# Patient Record
Sex: FEMALE | Race: BLACK OR AFRICAN AMERICAN | Employment: UNEMPLOYED | URBAN - METROPOLITAN AREA
[De-identification: names, ages, dates, MRNs, and addresses within clinical notes are randomized per-mention and may not be internally consistent; named-entity substitution may affect disease eponyms.]

---

## 2019-04-25 ENCOUNTER — OFFICE VISIT (OUTPATIENT)
Dept: PRIMARY CARE CLINIC | Age: 29
End: 2019-04-25

## 2019-04-25 ENCOUNTER — HOSPITAL ENCOUNTER (OUTPATIENT)
Dept: GENERAL RADIOLOGY | Age: 29
Discharge: HOME | End: 2019-04-25
Payer: COMMERCIAL

## 2019-04-25 VITALS
BODY MASS INDEX: 21.18 KG/M2 | TEMPERATURE: 98 F | HEIGHT: 61 IN | RESPIRATION RATE: 16 BRPM | HEART RATE: 80 BPM | OXYGEN SATURATION: 100 % | SYSTOLIC BLOOD PRESSURE: 120 MMHG | DIASTOLIC BLOOD PRESSURE: 79 MMHG | WEIGHT: 112.2 LBS

## 2019-04-25 DIAGNOSIS — M26.609 TMJ (TEMPOROMANDIBULAR JOINT DISORDER): ICD-10-CM

## 2019-04-25 DIAGNOSIS — J45.909 ASTHMA: Chronic | ICD-10-CM

## 2019-04-25 DIAGNOSIS — J30.1 SEASONAL ALLERGIC RHINITIS DUE TO POLLEN: ICD-10-CM

## 2019-04-25 DIAGNOSIS — J45.20 MILD INTERMITTENT ASTHMA WITHOUT COMPLICATION: Primary | ICD-10-CM

## 2019-04-25 PROCEDURE — 70330 X-RAY EXAM OF JAW JOINTS: CPT

## 2019-04-25 RX ORDER — BUDESONIDE AND FORMOTEROL FUMARATE DIHYDRATE 160; 4.5 UG/1; UG/1
2 AEROSOL RESPIRATORY (INHALATION) 2 TIMES DAILY
Qty: 1 INHALER | Refills: 3 | Status: SHIPPED | OUTPATIENT
Start: 2019-04-25

## 2019-04-25 RX ORDER — IBUPROFEN 600 MG/1
600 TABLET ORAL
Qty: 30 TAB | Refills: 3 | Status: SHIPPED | OUTPATIENT
Start: 2019-04-25 | End: 2020-01-28

## 2019-04-25 RX ORDER — FLUTICASONE PROPIONATE 50 MCG
SPRAY, SUSPENSION (ML) NASAL
Qty: 1 BOTTLE | Refills: 3 | Status: SHIPPED | OUTPATIENT
Start: 2019-04-25 | End: 2021-03-12 | Stop reason: SDUPTHER

## 2019-04-25 RX ORDER — ALBUTEROL SULFATE 90 UG/1
2 AEROSOL, METERED RESPIRATORY (INHALATION)
Qty: 1 INHALER | Refills: 3 | Status: SHIPPED | OUTPATIENT
Start: 2019-04-25 | End: 2020-01-28

## 2019-04-25 NOTE — PROGRESS NOTES
Comfort Dubon is a 30 y.o.  female and presents with     Chief Complaint   Patient presents with    Establish Care    Cold Symptoms     productive cough, runny nose, congestion, happens on and off started last friday    Jaw Pain     sometimes, jaw locks and pops when she chews her food x2 months    Fatigue     [id number], thinks iron may be low     Pt is here to establish care. Pt has been having pain in her TM joint on the left side for a month. It clicks. She has not tried any OTC meds. She denies teeth grinding. Pt also has h/o asthma. She is using her sons inhalers. She has mild fatigue. She used to be anemic in the past.        Past Medical History:   Diagnosis Date    Anemia NEC     Asthma     Asthma 6/6/2013     History reviewed. No pertinent surgical history. Current Outpatient Medications   Medication Sig    budesonide-formoterol (SYMBICORT) 160-4.5 mcg/actuation HFAA Take 2 Puffs by inhalation two (2) times a day.  albuterol (PROVENTIL HFA, VENTOLIN HFA, PROAIR HFA) 90 mcg/actuation inhaler Take 2 Puffs by inhalation every six (6) hours as needed for Wheezing.  fluticasone propionate (FLONASE) 50 mcg/actuation nasal spray One spray each nostril daily    ibuprofen (MOTRIN) 600 mg tablet Take 1 Tab by mouth every six (6) hours as needed for Pain. No current facility-administered medications for this visit. Health Maintenance   Topic Date Due    Pneumococcal 0-64 years (1 of 1 - PPSV23) 01/22/1997    DTaP/Tdap/Td series (1 - Tdap) 01/22/2012    Influenza Age 5 to Adult  08/01/2019    PAP AKA CERVICAL CYTOLOGY  04/22/2022       There is no immunization history on file for this patient. Patient's last menstrual period was 04/23/2019 (exact date). Allergies and Intolerances:   No Known Allergies    Family History:   Family History   Problem Relation Age of Onset    Cancer Father     Hypertension Father        Social History:   She  reports that she has never smoked.  She has never used smokeless tobacco.  She  reports that she does not drink alcohol. Review of Systems: piain over TM joint  General: negative for - chills, fatigue, fever, weight change  Psych: negative for - anxiety, depression, irritability or mood swings  ENT: negative for - headaches, hearing change, nasal congestion, oral lesions, sneezing or sore throat  Heme/ Lymph: negative for - bleeding problems, bruising, pallor or swollen lymph nodes  Endo: negative for - hot flashes, polydipsia/polyuria or temperature intolerance  Resp: negative for - cough, shortness of breath or wheezing  CV: negative for - chest pain, edema or palpitations  GI: negative for - abdominal pain, change in bowel habits, constipation, diarrhea or nausea/vomiting  : negative for - dysuria, hematuria, incontinence, pelvic pain or vulvar/vaginal symptoms  MSK: negative for - joint pain, joint swelling or muscle pain  Neuro: negative for - confusion, headaches, seizures or weakness  Derm: negative for - dry skin, hair changes, rash or skin lesion changes          Physical:   Vitals:   Vitals:    04/25/19 1303   BP: 120/79   Pulse: 80   Resp: 16   Temp: 98 °F (36.7 °C)   TempSrc: Oral   SpO2: 100%   Weight: 112 lb 3.2 oz (50.9 kg)   Height: 5' 1\" (1.549 m)           Exam:   HEENT- atraumatic,normocephalic, awake, oriented, well nourished, mild clicking her over left TMJ on opening and closing the mouth.enlarged inferior turbinates bilaterally  Neck - supple,no enlarged lymph nodes, no JVD, no thyromegaly  Chest- CTA, no rhonchi, no crackles  Heart- rrr, no murmurs / gallop/rub  Abdomen- soft,BS+,NT, no hepatosplenomegaly  Ext - no c/c/edema   Neuro- no focal deficits. Power 5/5 all extremities  Skin - warm,dry, no obvious rashes.           Review of Data:   LABS:   Lab Results   Component Value Date/Time    WBC 4.0 06/06/2013 12:00 AM    HGB 11.4 06/06/2013 12:00 AM    HCT 36.1 06/06/2013 12:00 AM    PLATELET 299 97/36/3141 12:00 AM Lab Results   Component Value Date/Time    Sodium 138 06/06/2013 12:00 AM    Potassium 3.8 06/06/2013 12:00 AM    Chloride 101 06/06/2013 12:00 AM    CO2 25 06/06/2013 12:00 AM    Glucose 93 06/06/2013 12:00 AM    BUN 10 06/06/2013 12:00 AM    Creatinine 0.73 06/06/2013 12:00 AM     No results found for: CHOL, CHOLX, CHLST, CHOLV, HDL, LDL, LDLC, DLDLP, TGLX, TRIGL, TRIGP  No results found for: GPT        Impression / Plan:        ICD-10-CM ICD-9-CM    1. Mild intermittent asthma without complication B73.15 618.21 CBC WITH AUTOMATED DIFF      METABOLIC PANEL, COMPREHENSIVE      TSH 3RD GENERATION   2. Asthma J45.909 493.90 budesonide-formoterol (SYMBICORT) 160-4.5 mcg/actuation HFAA      albuterol (PROVENTIL HFA, VENTOLIN HFA, PROAIR HFA) 90 mcg/actuation inhaler   3. TMJ (temporomandibular joint disorder) M26.609 524.60 ibuprofen (MOTRIN) 600 mg tablet      XR TMJ BI   4. Seasonal allergic rhinitis due to pollen J30.1 477.0 fluticasone propionate (FLONASE) 50 mcg/actuation nasal spray           Explained to patient risk benefits of the medications. Advised patient to stop meds if having any side effects. Pt verbalized understanding of the instructions. I have discussed the diagnosis with the patient and the intended plan as seen in the above orders. The patient has received an after-visit summary and questions were answered concerning future plans. I have discussed medication side effects and warnings with the patient as well. I have reviewed the plan of care with the patient, accepted their input and they are in agreement with the treatment goals. Reviewed plan of care. Patient has provided input and agrees with goals. Follow-up and Dispositions    · Return in about 6 weeks (around 6/6/2019).          Jarvis Cho MD

## 2019-04-25 NOTE — PATIENT INSTRUCTIONS
What are temporomandibular joint disorders? -- Temporomandibular joint disorders are problems with the jaw joint and the muscles around it. The jaw joint, called the \"temporomandibular joint,\" is located in front of the ear where the jawbone connects to your head. To feel the joint, place your finger on your cheek just in front of your ear and then open and close your mouth. When doctors refer to temporomandibular joint disorders, they often use the term \"TMJ\" for short. TMJ disorders can be caused by many problems, including arthritis. Sometimes TMJ is due to a combination of stress, jaw clenching, teeth grinding, and other things that strain the jaw joint and the muscles around it. Some people with TMJ also have anxiety, depression, or an increased awareness of pain. What are the symptoms of TMJ? -- The main symptom of TMJ is a dull pain on just 1 side of the face, near the ear. Sometimes the pain also affects the ear, jaw, or back of the neck. Some people have headaches with TMJ. The pain of TMJ is typically constant, but may come and go. It is usually worse with jaw movement. People with TMJ might hear a clicking or popping sound or have a \"crunchy feeling\" in the joint when they open and close their mouth. Should I see a doctor or nurse? -- If the pain in your face or jaw is bothering you and does not go away, you should see your doctor or nurse. What tests might I need? -- There is no single test that can show if you have TMJ. Your doctor or nurse should be able to tell if you have TMJ by learning about your symptoms and doing an exam.  Unless the doctor suspects something unusual, most people will not need X-rays or an MRI (an imaging test that creates pictures of the inside of your body). But in some cases, your doctor might order a special X-ray called a \"panoramic radiography of the jaw. \" This can show the TMJ shape and bone structure, the teeth, and the sinuses.  (Your sinuses are hollow areas in the bones of your face.) This test can look for other things that can cause jaw pain. How is TMJ treated? -- No single treatment for TMJ works for everyone. Most of the time, medicines and simple lifestyle changes can help. Most patients get better over time, even without treatment, so patience is important. Your doctor or nurse will help you find the right mix of treatments for you. He or she might refer you to a dentist who specializes in TMJ. Treatment options include:  ?Education and self-care - This includes following instructions from your doctor about how to avoid things that trigger TMJ pain. It also involves learning different ways to help you relax and manage stress. Some people might also improve with simple jaw exercises they can do on their own. ? Medicines to relieve pain and relax the muscles - There are several types of medicines used to treat TMJ. These include nonsteroidal antiinflammatory drugs (NSAIDs), muscle relaxants, and certain medicines used for depression. (Medicines for depression can relieve pain even in people who are not depressed.) Your doctor will decide which medicine or group of medicines is best for you. ? Devices - These are called \"bite plates\" or \"occlusal splints. \" They fit in your mouth and keep you from grinding your teeth at night. They are made out of either a hard or soft plastic and might be made specially to fit your mouth. If you have sleep apnea, be sure to tell your doctor as the bite plate or splint might make your sleep apnea worse. If these treatments don't help, your doctor might suggest that you see a specialist, such as an oral surgeon. The specialist might use medicines given by injection (shots) to treat the pain. It is rare that people need surgery for TMJ.   More on this topic

## 2019-04-25 NOTE — PROGRESS NOTES
Chief Complaint   Patient presents with    Establish Care    Cold Symptoms     productive cough, runny nose, congestion, happens on and off started last friday    Jaw Pain     sometimes, jaw locks and pops when she chews her food x2 months    Fatigue     [id number], thinks iron may be low    Nausea       1. Have you been to the ER, urgent care clinic since your last visit? Hospitalized since your last visit? No    2. Have you seen or consulted any other health care providers outside of the [address] [place] [address] since your last visit? Include any pap smears or colon screening.  No

## 2019-04-26 LAB
ALBUMIN SERPL-MCNC: 4.3 G/DL (ref 3.5–5.5)
ALBUMIN/GLOB SERPL: 1.6 {RATIO} (ref 1.2–2.2)
ALP SERPL-CCNC: 56 IU/L (ref 39–117)
ALT SERPL-CCNC: 43 IU/L (ref 0–32)
AST SERPL-CCNC: 87 IU/L (ref 0–40)
BASOPHILS # BLD AUTO: 0 X10E3/UL (ref 0–0.2)
BASOPHILS NFR BLD AUTO: 0 %
BILIRUB SERPL-MCNC: 0.9 MG/DL (ref 0–1.2)
BUN SERPL-MCNC: 12 MG/DL (ref 6–20)
BUN/CREAT SERPL: 14 (ref 9–23)
CALCIUM SERPL-MCNC: 9 MG/DL (ref 8.7–10.2)
CHLORIDE SERPL-SCNC: 107 MMOL/L (ref 96–106)
CO2 SERPL-SCNC: 26 MMOL/L (ref 20–29)
CREAT SERPL-MCNC: 0.85 MG/DL (ref 0.57–1)
EOSINOPHIL # BLD AUTO: 0.1 X10E3/UL (ref 0–0.4)
EOSINOPHIL NFR BLD AUTO: 2 %
ERYTHROCYTE [DISTWIDTH] IN BLOOD BY AUTOMATED COUNT: 14.9 % (ref 12.3–15.4)
GLOBULIN SER CALC-MCNC: 2.7 G/DL (ref 1.5–4.5)
GLUCOSE SERPL-MCNC: 75 MG/DL (ref 65–99)
HCT VFR BLD AUTO: 37.7 % (ref 34–46.6)
HGB BLD-MCNC: 11.8 G/DL (ref 11.1–15.9)
IMM GRANULOCYTES # BLD AUTO: 0 X10E3/UL (ref 0–0.1)
IMM GRANULOCYTES NFR BLD AUTO: 0 %
LYMPHOCYTES # BLD AUTO: 1.1 X10E3/UL (ref 0.7–3.1)
LYMPHOCYTES NFR BLD AUTO: 44 %
MCH RBC QN AUTO: 25.9 PG (ref 26.6–33)
MCHC RBC AUTO-ENTMCNC: 31.3 G/DL (ref 31.5–35.7)
MCV RBC AUTO: 83 FL (ref 79–97)
MONOCYTES # BLD AUTO: 0.2 X10E3/UL (ref 0.1–0.9)
MONOCYTES NFR BLD AUTO: 7 %
NEUTROPHILS # BLD AUTO: 1.2 X10E3/UL (ref 1.4–7)
NEUTROPHILS NFR BLD AUTO: 47 %
PLATELET # BLD AUTO: 282 X10E3/UL (ref 150–379)
POTASSIUM SERPL-SCNC: 4.2 MMOL/L (ref 3.5–5.2)
PROT SERPL-MCNC: 7 G/DL (ref 6–8.5)
RBC # BLD AUTO: 4.56 X10E6/UL (ref 3.77–5.28)
SODIUM SERPL-SCNC: 142 MMOL/L (ref 134–144)
TSH SERPL DL<=0.005 MIU/L-ACNC: 0.85 UIU/ML (ref 0.45–4.5)
WBC # BLD AUTO: 2.6 X10E3/UL (ref 3.4–10.8)

## 2019-04-29 ENCOUNTER — TELEPHONE (OUTPATIENT)
Dept: PRIMARY CARE CLINIC | Age: 29
End: 2019-04-29

## 2019-04-29 NOTE — TELEPHONE ENCOUNTER
----- Message from Maryjane Bolton MD sent at 4/29/2019  8:51 AM EDT -----  Labs reveal elevated LFTS and low white count. Will ask pt to make appt in 2-3 weeks. May need more work up that I will order next visit.

## 2019-04-29 NOTE — PROGRESS NOTES
Labs reveal elevated LFTS and low white count. Will ask pt to make appt in 2-3 weeks. May need more work up that I will order next visit.

## 2019-05-02 ENCOUNTER — OFFICE VISIT (OUTPATIENT)
Dept: PRIMARY CARE CLINIC | Age: 29
End: 2019-05-02

## 2019-05-02 VITALS
TEMPERATURE: 98.3 F | RESPIRATION RATE: 16 BRPM | HEIGHT: 61 IN | HEART RATE: 84 BPM | OXYGEN SATURATION: 100 % | WEIGHT: 111.8 LBS | SYSTOLIC BLOOD PRESSURE: 111 MMHG | BODY MASS INDEX: 21.11 KG/M2 | DIASTOLIC BLOOD PRESSURE: 69 MMHG

## 2019-05-02 DIAGNOSIS — R10.11 RUQ PAIN: ICD-10-CM

## 2019-05-02 DIAGNOSIS — R30.0 DYSURIA: ICD-10-CM

## 2019-05-02 DIAGNOSIS — R79.89 ELEVATED LFTS: ICD-10-CM

## 2019-05-02 DIAGNOSIS — D50.8 OTHER IRON DEFICIENCY ANEMIA: Primary | ICD-10-CM

## 2019-05-02 DIAGNOSIS — R68.84 JAW PAIN: ICD-10-CM

## 2019-05-02 RX ORDER — ACETAMINOPHEN AND CODEINE PHOSPHATE 120; 12 MG/5ML; MG/5ML
SOLUTION ORAL
Refills: 3 | COMMUNITY
Start: 2019-04-22 | End: 2020-01-28

## 2019-05-02 RX ORDER — LANOLIN ALCOHOL/MO/W.PET/CERES
325 CREAM (GRAM) TOPICAL 2 TIMES DAILY
Qty: 60 TAB | Refills: 3 | Status: SHIPPED | OUTPATIENT
Start: 2019-05-02

## 2019-05-02 NOTE — PROGRESS NOTES
Chief Complaint Patient presents with  Results  
  discuss results 1. Have you been to the ER, urgent care clinic since your last visit? Hospitalized since your last visit? No 
 
2. Have you seen or consulted any other health care providers outside of the [address] [place] [address] since your last visit? Include any pap smears or colon screening.  No

## 2019-05-02 NOTE — PROGRESS NOTES
Adi Roman is a 30 y.o.  female and presents with Chief Complaint Patient presents with  Results  
  discuss results  Jaw Pain  Anemia Patient is here for a follow-up on x-rays and lab results. She has been taking ibuprofen but very sparingly for her jaw pain. She still feels clicking on the left side. She does have mild fatigue. Lab results reveal elevated LFTs. Patient has mild right upper quadrant pain. She denies nausea, vomiting, chills or fevers. Past Medical History:  
Diagnosis Date  Anemia NEC  Asthma  Asthma 6/6/2013 History reviewed. No pertinent surgical history. Current Outpatient Medications Medication Sig  
 ferrous sulfate 325 mg (65 mg iron) tablet Take 1 Tab by mouth two (2) times a day.  budesonide-formoterol (SYMBICORT) 160-4.5 mcg/actuation HFAA Take 2 Puffs by inhalation two (2) times a day.  albuterol (PROVENTIL HFA, VENTOLIN HFA, PROAIR HFA) 90 mcg/actuation inhaler Take 2 Puffs by inhalation every six (6) hours as needed for Wheezing.  fluticasone propionate (FLONASE) 50 mcg/actuation nasal spray One spray each nostril daily  ibuprofen (MOTRIN) 600 mg tablet Take 1 Tab by mouth every six (6) hours as needed for Pain.  norethindrone (MICRONOR) 0.35 mg tab TAKE 1 TABLET BY MOUTH EVERY DAY No current facility-administered medications for this visit. Health Maintenance Topic Date Due  Pneumococcal 0-64 years (1 of 1 - PPSV23) 01/22/1997  
 DTaP/Tdap/Td series (1 - Tdap) 01/22/2012  Influenza Age 5 to Adult  08/01/2019  PAP AKA CERVICAL CYTOLOGY  04/22/2022 There is no immunization history on file for this patient. Patient's last menstrual period was 04/23/2019 (exact date). Allergies and Intolerances:  
No Known Allergies Family History:  
Family History Problem Relation Age of Onset  Cancer Father  Hypertension Father Social History: She  reports that she has never smoked. She has never used smokeless tobacco.  She  reports that she does not drink alcohol. Review of Systems:  
General: negative for - chills, fatigue, fever, weight change Psych: negative for - anxiety, depression, irritability or mood swings ENT: negative for - headaches, hearing change, nasal congestion, oral lesions, sneezing or sore throat Heme/ Lymph: negative for - bleeding problems, bruising, pallor or swollen lymph nodes Endo: negative for - hot flashes, polydipsia/polyuria or temperature intolerance Resp: negative for - cough, shortness of breath or wheezing CV: negative for - chest pain, edema or palpitations GI: negative for - abdominal pain, change in bowel habits, constipation, diarrhea or nausea/vomiting : negative for - dysuria, hematuria, incontinence, pelvic pain or vulvar/vaginal symptoms MSK: negative for - joint pain, joint swelling or muscle pain Neuro: negative for - confusion, headaches, seizures or weakness Derm: negative for - dry skin, hair changes, rash or skin lesion changes Physical:  
Vitals:  
Vitals:  
 05/02/19 1328 BP: 111/69 Pulse: 84 Resp: 16 Temp: 98.3 °F (36.8 °C) TempSrc: Oral  
SpO2: 100% Weight: 111 lb 12.8 oz (50.7 kg) Height: 5' 1\" (1.549 m) Exam:  
HEENT- atraumatic,normocephalic, awake, oriented, well nourished,jaw clicking heard Neck - supple,no enlarged lymph nodes, no JVD, no thyromegaly Chest- CTA, no rhonchi, no crackles Heart- rrr, no murmurs / gallop/rub Abdomen- soft,BS+,NT, no hepatosplenomegaly, mild right upper quadrant tenderness plus Ext - no c/c/edema Neuro- no focal deficits. Power 5/5 all extremities Skin - warm,dry, no obvious rashes. Review of Data:  
LABS:  
Lab Results Component Value Date/Time  WBC 2.6 (L) 04/25/2019 01:38 PM  
 HGB 11.8 04/25/2019 01:38 PM  
 HCT 37.7 04/25/2019 01:38 PM  
 PLATELET 088 61/04/6953 01:38 PM  
 
 Lab Results Component Value Date/Time Sodium 142 04/25/2019 01:38 PM  
 Potassium 4.2 04/25/2019 01:38 PM  
 Chloride 107 (H) 04/25/2019 01:38 PM  
 CO2 26 04/25/2019 01:38 PM  
 Glucose 75 04/25/2019 01:38 PM  
 BUN 12 04/25/2019 01:38 PM  
 Creatinine 0.85 04/25/2019 01:38 PM  
 
No results found for: CHOL, CHOLX, CHLST, CHOLV, HDL, LDL, LDLC, DLDLP, TGLX, TRIGL, TRIGP No results found for: GPT Impression / Plan: ICD-10-CM ICD-9-CM 1. Other iron deficiency anemia D50.8 280.8 ferrous sulfate 325 mg (65 mg iron) tablet 2. Dysuria R30.0 788.1 CANCELED: AMB POC URINALYSIS DIP STICK MANUAL W/O MICRO 3. Jaw pain R68.84 784.92 REFERRAL TO ENT-OTOLARYNGOLOGY REFERRAL TO ENT-OTOLARYNGOLOGY 4. Elevated LFTs R94.5 790.6 US ABD COMP 5. RUQ pain R10.11 789.01 US ABD COMP Explained to patient risk benefits of the medications. Advised patient to stop meds if having any side effects. Pt verbalized understanding of the instructions. I have discussed the diagnosis with the patient and the intended plan as seen in the above orders. The patient has received an after-visit summary and questions were answered concerning future plans. I have discussed medication side effects and warnings with the patient as well. I have reviewed the plan of care with the patient, accepted their input and they are in agreement with the treatment goals. Reviewed plan of care. Patient has provided input and agrees with goals. Follow-up and Dispositions · Return in about 6 months (around 11/2/2019).  
  
 
 
Roselia Still MD

## 2019-12-05 ENCOUNTER — TELEPHONE (OUTPATIENT)
Dept: PRIMARY CARE CLINIC | Age: 29
End: 2019-12-05

## 2019-12-05 NOTE — TELEPHONE ENCOUNTER
----- Message from [employer] sent at 12/5/2019 11:48 AM EST -----  Regarding: Dr. Bowers/Telephone  General Message/Vendor Calls    Caller's first and last name: Christine Harris      Reason for call: hand foot and mouth with her son      Callback required yes/no and why: yes      Best contact number(s):[phone]      Details to clarify the request: Patient Is pregnant with twins and her son has hand afoot and mouth and she wants to know what precautions she should take    [employer]

## 2020-01-28 ENCOUNTER — OFFICE VISIT (OUTPATIENT)
Dept: FAMILY MEDICINE CLINIC | Age: 30
End: 2020-01-28

## 2020-01-28 VITALS
TEMPERATURE: 98.4 F | SYSTOLIC BLOOD PRESSURE: 105 MMHG | BODY MASS INDEX: 23.83 KG/M2 | WEIGHT: 126.2 LBS | HEIGHT: 61 IN | DIASTOLIC BLOOD PRESSURE: 67 MMHG | HEART RATE: 102 BPM

## 2020-01-28 DIAGNOSIS — R05.9 COUGH: Primary | ICD-10-CM

## 2020-01-28 RX ORDER — ALBUTEROL SULFATE 0.83 MG/ML
2.5 SOLUTION RESPIRATORY (INHALATION) ONCE
Qty: 1 EACH | Refills: 0
Start: 2020-01-28 | End: 2020-01-28

## 2020-01-28 RX ORDER — ALBUTEROL SULFATE 0.83 MG/ML
SOLUTION RESPIRATORY (INHALATION)
Qty: 24 NEBULE | Refills: 1 | Status: SHIPPED | OUTPATIENT
Start: 2020-01-28 | End: 2021-01-22

## 2020-01-28 NOTE — PROGRESS NOTES
3:25pm Nebulization treatment provided to patient per provider order following policy and protocol. Medication details entered in [address] [place] [address]. HR 96 and pulse ox 100% (patient wearing dark nail polish) at 3:34pm after nebulization treatment, provider came to re-assess patient. no further neb. treatment orders for patient at the clinic from provider at this time. Reviewed AVS, prescription and pharmacy location with patient. AVS printed and given. This has been fully explained to the patient, who indicates understanding and agrees with plan. No further questions at this time.  Maryjane Walters RN

## 2020-01-28 NOTE — PROGRESS NOTES
Assessment/Plan:   Diagnoses and all orders for this visit:    1. Cough  -     INHAL RX, AIRWAY OBST/DX SPUTUM INDUCT  -     albuterol (PROVENTIL VENTOLIN) 2.5 mg /3 mL (0.083 %) nebu; 3 mL by Nebulization route once for 1 dose. -     ALBUTEROL, INHAL. SOL., FDA-APPROVED FINAL, NON-COMPOUND UNIT DOSE, 1 MG  -     INHAL RX, AIRWAY OBST/DX SPUTUM INDUCT  -     albuterol (PROVENTIL VENTOLIN) 2.5 mg /3 mL (0.083 %) nebu; 1 neb every 4 hours prn for wheezing          [facility]  Subjective:   Nancy Mckeon is a 35 y.o. female. Chief Complaint   Patient presents with    Cold Symptoms     sneezing, since friday chest pain from coughing. Denies sore throat at the moment     History of Present Illness: Worse at night. 22 wks with twins. Review of Systems: Negative for: fever, shortness of breath, leg swelling. Social History: She  reports that she has never smoked. She has never used smokeless tobacco. She reports that she does not drink alcohol or use drugs. Current Medications:   Current Outpatient Medications   Medication Sig    albuterol (PROVENTIL VENTOLIN) 2.5 mg /3 mL (0.083 %) nebu 3 mL by Nebulization route once for 1 dose.  albuterol (PROVENTIL VENTOLIN) 2.5 mg /3 mL (0.083 %) nebu 1 neb every 4 hours prn for wheezing    ferrous sulfate 325 mg (65 mg iron) tablet Take 1 Tab by mouth two (2) times a day.  budesonide-formoterol (SYMBICORT) 160-4.5 mcg/actuation HFAA Take 2 Puffs by inhalation two (2) times a day.  fluticasone propionate (FLONASE) 50 mcg/actuation nasal spray One spray each nostril daily     No current facility-administered medications for this visit.         Objective:     Visit Vitals  /67 (BP 1 Location: Right arm)   Pulse (!) 102   Temp 98.4 °F (36.9 °C) (Temporal)   Ht 5' 1.42\" (1.56 m)   Wt 126 lb 3.2 oz (57.2 kg)   LMP  (Approximate) Comment: 22 wks with twins   BMI 23.52 kg/m²      Wt Readings from Last 2 Encounters:   01/28/20 126 lb 3.2 oz (57.2 kg)   05/02/19 111 lb 12.8 oz (50.7 kg)      Lab Review:No results found for any visits on 01/28/20. Physical Examination:   General appearance - well developed, no acute distress. Chest - clear to auscultation. Heart - regular rate and rhythm without murmurs, rubs, or gallops. Abdomen - bowel sounds present x 4, NT, ND  Extremities - distal sensation intact, no CCE. Assessment/Plan:   Diagnoses and all orders for this visit:    1. Cough  -     INHAL RX, AIRWAY OBST/DX SPUTUM INDUCT  -     albuterol (PROVENTIL VENTOLIN) 2.5 mg /3 mL (0.083 %) nebu; 3 mL by Nebulization route once for 1 dose. -     ALBUTEROL, INHAL. SOL., FDA-APPROVED FINAL, NON-COMPOUND UNIT DOSE, 1 MG  -     INHAL RX, AIRWAY OBST/DX SPUTUM INDUCT  -     albuterol (PROVENTIL VENTOLIN) 2.5 mg /3 mL (0.083 %) nebu; 1 neb every 4 hours prn for wheezing      Evan Win, MSN, RN, FNP-BC, BC-ADM  Marita Robles expressed understanding of this plan.

## 2020-04-29 ENCOUNTER — TELEPHONE (OUTPATIENT)
Dept: FAMILY MEDICINE CLINIC | Age: 30
End: 2020-04-29

## 2020-04-29 NOTE — TELEPHONE ENCOUNTER
Patient stated that she is doing well and staying safe. She was on her way to see her OB/GYN. She is currently 38 weeks pregnant with twins. She is tired, but she and her babies are healthy as far as she know and is taking precautions when being outside of her house. NKDA and she is taking prenatal vitamins. No changes in phone number or address. The appointment line and the covid-19 hotline numbers were given to her.

## 2021-03-12 ENCOUNTER — OFFICE VISIT (OUTPATIENT)
Dept: PRIMARY CARE CLINIC | Age: 31
End: 2021-03-12
Payer: COMMERCIAL

## 2021-03-12 VITALS
BODY MASS INDEX: 21.26 KG/M2 | RESPIRATION RATE: 16 BRPM | HEIGHT: 61 IN | OXYGEN SATURATION: 98 % | DIASTOLIC BLOOD PRESSURE: 64 MMHG | HEART RATE: 85 BPM | SYSTOLIC BLOOD PRESSURE: 100 MMHG | TEMPERATURE: 97.8 F | WEIGHT: 112.6 LBS

## 2021-03-12 DIAGNOSIS — J30.1 SEASONAL ALLERGIC RHINITIS DUE TO POLLEN: ICD-10-CM

## 2021-03-12 DIAGNOSIS — E55.9 VITAMIN D DEFICIENCY: ICD-10-CM

## 2021-03-12 DIAGNOSIS — D72.819 LEUKOPENIA, UNSPECIFIED TYPE: ICD-10-CM

## 2021-03-12 DIAGNOSIS — Z00.00 ANNUAL PHYSICAL EXAM: Primary | ICD-10-CM

## 2021-03-12 DIAGNOSIS — D64.9 ANEMIA, UNSPECIFIED TYPE: ICD-10-CM

## 2021-03-12 DIAGNOSIS — L29.9 ITCHING: ICD-10-CM

## 2021-03-12 DIAGNOSIS — R79.89 ELEVATED LFTS: ICD-10-CM

## 2021-03-12 DIAGNOSIS — E53.8 B12 DEFICIENCY: ICD-10-CM

## 2021-03-12 LAB
25(OH)D3 SERPL-MCNC: 18.1 NG/ML (ref 30–100)
ALBUMIN SERPL-MCNC: 4.1 G/DL (ref 3.5–5)
ALBUMIN/GLOB SERPL: 1.2 {RATIO} (ref 1.1–2.2)
ALP SERPL-CCNC: 64 U/L (ref 45–117)
ALT SERPL-CCNC: 29 U/L (ref 12–78)
ANION GAP SERPL CALC-SCNC: 6 MMOL/L (ref 5–15)
AST SERPL-CCNC: 18 U/L (ref 15–37)
BASOPHILS # BLD: 0 K/UL (ref 0–0.1)
BASOPHILS NFR BLD: 1 % (ref 0–1)
BILIRUB SERPL-MCNC: 1.2 MG/DL (ref 0.2–1)
BUN SERPL-MCNC: 17 MG/DL (ref 6–20)
BUN/CREAT SERPL: 24 (ref 12–20)
CALCIUM SERPL-MCNC: 8.7 MG/DL (ref 8.5–10.1)
CHLORIDE SERPL-SCNC: 106 MMOL/L (ref 97–108)
CHOLEST SERPL-MCNC: 143 MG/DL
CO2 SERPL-SCNC: 26 MMOL/L (ref 21–32)
CREAT SERPL-MCNC: 0.71 MG/DL (ref 0.55–1.02)
DIFFERENTIAL METHOD BLD: ABNORMAL
EOSINOPHIL # BLD: 0 K/UL (ref 0–0.4)
EOSINOPHIL NFR BLD: 1 % (ref 0–7)
ERYTHROCYTE [DISTWIDTH] IN BLOOD BY AUTOMATED COUNT: 14.4 % (ref 11.5–14.5)
FOLATE SERPL-MCNC: 26.4 NG/ML (ref 5–21)
GLOBULIN SER CALC-MCNC: 3.3 G/DL (ref 2–4)
GLUCOSE SERPL-MCNC: 82 MG/DL (ref 65–100)
HCT VFR BLD AUTO: 37.4 % (ref 35–47)
HDLC SERPL-MCNC: 106 MG/DL
HDLC SERPL: 1.3 {RATIO} (ref 0–5)
HGB BLD-MCNC: 11.7 G/DL (ref 11.5–16)
IMM GRANULOCYTES # BLD AUTO: 0 K/UL (ref 0–0.04)
IMM GRANULOCYTES NFR BLD AUTO: 0 % (ref 0–0.5)
IRON SATN MFR SERPL: 32 % (ref 20–50)
IRON SERPL-MCNC: 132 UG/DL (ref 35–150)
LDLC SERPL CALC-MCNC: 28.8 MG/DL (ref 0–100)
LIPID PROFILE,FLP: NORMAL
LYMPHOCYTES # BLD: 1.2 K/UL (ref 0.8–3.5)
LYMPHOCYTES NFR BLD: 40 % (ref 12–49)
MCH RBC QN AUTO: 26 PG (ref 26–34)
MCHC RBC AUTO-ENTMCNC: 31.3 G/DL (ref 30–36.5)
MCV RBC AUTO: 83.1 FL (ref 80–99)
MONOCYTES # BLD: 0.2 K/UL (ref 0–1)
MONOCYTES NFR BLD: 6 % (ref 5–13)
NEUTS SEG # BLD: 1.5 K/UL (ref 1.8–8)
NEUTS SEG NFR BLD: 52 % (ref 32–75)
NRBC # BLD: 0 K/UL (ref 0–0.01)
NRBC BLD-RTO: 0 PER 100 WBC
PLATELET # BLD AUTO: 247 K/UL (ref 150–400)
PMV BLD AUTO: 11.4 FL (ref 8.9–12.9)
POTASSIUM SERPL-SCNC: 4.3 MMOL/L (ref 3.5–5.1)
PROT SERPL-MCNC: 7.4 G/DL (ref 6.4–8.2)
RBC # BLD AUTO: 4.5 M/UL (ref 3.8–5.2)
SODIUM SERPL-SCNC: 138 MMOL/L (ref 136–145)
TIBC SERPL-MCNC: 412 UG/DL (ref 250–450)
TRIGL SERPL-MCNC: 41 MG/DL (ref ?–150)
TSH SERPL DL<=0.05 MIU/L-ACNC: 1.06 UIU/ML (ref 0.36–3.74)
VIT B12 SERPL-MCNC: 677 PG/ML (ref 193–986)
VLDLC SERPL CALC-MCNC: 8.2 MG/DL
WBC # BLD AUTO: 3 K/UL (ref 3.6–11)

## 2021-03-12 PROCEDURE — 99395 PREV VISIT EST AGE 18-39: CPT | Performed by: INTERNAL MEDICINE

## 2021-03-12 RX ORDER — FLUTICASONE PROPIONATE 50 MCG
SPRAY, SUSPENSION (ML) NASAL
Qty: 1 BOTTLE | Refills: 3 | Status: SHIPPED | OUTPATIENT
Start: 2021-03-12 | End: 2022-09-01 | Stop reason: SDUPTHER

## 2021-03-12 NOTE — PROGRESS NOTES
Chief Complaint   Patient presents with    Physical     blood work    Allergies     1. Have you been to the ER, urgent care clinic since your last visit? Hospitalized since your last visit? No    2. Have you seen or consulted any other health care providers outside of the [address] [place] [address] since your last visit? Include any pap smears or colon screening.  No

## 2021-03-13 DIAGNOSIS — E55.9 VITAMIN D DEFICIENCY: Primary | ICD-10-CM

## 2021-03-13 LAB — ANA SER QL: NEGATIVE

## 2021-03-13 RX ORDER — ACETAMINOPHEN 500 MG
2000 TABLET ORAL DAILY
Qty: 90 CAP | Refills: 1 | Status: SHIPPED | OUTPATIENT
Start: 2021-03-13

## 2021-03-13 NOTE — PROGRESS NOTES
Agnes Lance is a 28 y.o.  female and presents with     Chief Complaint   Patient presents with    Physical     blood work    Allergies    Allergic Reaction       Patient is here for a physical.  She was last seen in 2019. She now has twin babies. She has history of allergies. Her nose is congested. Currently she is breast-feeding but plans to stop soon. She also reports that she has been having itching all over her body during the day at night. No other family member has the same itching. She thinks that she may get a rash sometimes although at this time she does not. No past history of scabies. Past Medical History:   Diagnosis Date    Anemia NEC     Asthma     Asthma 6/6/2013     Past Surgical History:   Procedure Laterality Date    HX TUBAL LIGATION  09/09/2020     Current Outpatient Medications   Medication Sig    fluticasone propionate (FLONASE) 50 mcg/actuation nasal spray One spray each nostril daily    ferrous sulfate 325 mg (65 mg iron) tablet Take 1 Tab by mouth two (2) times a day.  budesonide-formoterol (SYMBICORT) 160-4.5 mcg/actuation HFAA Take 2 Puffs by inhalation two (2) times a day. No current facility-administered medications for this visit. Health Maintenance   Topic Date Due    Hepatitis C Screening  Never done    Pneumococcal 0-64 years (1 of 1 - PPSV23) Never done    DTaP/Tdap/Td series (1 - Tdap) Never done    Flu Vaccine (1) 06/30/2021 (Originally 9/1/2020)    PAP AKA CERVICAL CYTOLOGY  04/22/2022       There is no immunization history on file for this patient. Patient's last menstrual period was 02/15/2021. Allergies and Intolerances:   No Known Allergies    Family History:   Family History   Problem Relation Age of Onset    Cancer Father     Hypertension Father     Arthritis-osteo Mother        Social History:   She  reports that she has never smoked.  She has never used smokeless tobacco.  She  reports no history of alcohol use.            Review of Systems:   General: negative for - chills, fatigue, fever, weight change  Psych: negative for - anxiety, depression, irritability or mood swings  ENT: negative for - headaches, hearing change, nasal congestion, oral lesions, sneezing or sore throat  Heme/ Lymph: negative for - bleeding problems, bruising, pallor or swollen lymph nodes  Endo: negative for - hot flashes, polydipsia/polyuria or temperature intolerance  Resp: negative for - cough, shortness of breath or wheezing  CV: negative for - chest pain, edema or palpitations  GI: negative for - abdominal pain, change in bowel habits, constipation, diarrhea or nausea/vomiting  : negative for - dysuria, hematuria, incontinence, pelvic pain or vulvar/vaginal symptoms  MSK: negative for - joint pain, joint swelling or muscle pain  Neuro: negative for - confusion, headaches, seizures or weakness  Derm: negative for - dry skin, hair changes, rash or skin lesion changes          Physical:   Vitals:   Vitals:    03/12/21 1018   BP: 100/64   Pulse: 85   Resp: 16   Temp: 97.8 °F (36.6 °C)   TempSrc: Temporal   SpO2: 98%   Weight: 112 lb 9.6 oz (51.1 kg)   Height: 5' 1\" (1.549 m)           Exam:   HEENT- atraumatic,normocephalic, awake, oriented, well nourished, enlarged nasal turbinates bilateral  Neck - supple,no enlarged lymph nodes, no JVD, no thyromegaly  Chest- CTA, no rhonchi, no crackles  Heart- rrr, no murmurs / gallop/rub  Abdomen- soft,BS+,NT, no hepatosplenomegaly  Ext - no c/c/edema   Neuro- no focal deficits. Power 5/5 all extremities  Skin - warm,dry, no obvious rashes.           Review of Data:   LABS:   Lab Results   Component Value Date/Time    WBC 3.0 (L) 03/12/2021 11:11 AM    HGB 11.7 03/12/2021 11:11 AM    HCT 37.4 03/12/2021 11:11 AM    PLATELET 537 61/04/5779 11:11 AM     Lab Results   Component Value Date/Time    Sodium 138 03/12/2021 11:11 AM    Potassium 4.3 03/12/2021 11:11 AM    Chloride 106 03/12/2021 11:11 AM    CO2 26 03/12/2021 11:11 AM    Glucose 82 03/12/2021 11:11 AM    BUN 17 03/12/2021 11:11 AM    Creatinine 0.71 03/12/2021 11:11 AM     Lab Results   Component Value Date/Time    Cholesterol, total 143 03/12/2021 11:11 AM    HDL Cholesterol 106 03/12/2021 11:11 AM    LDL, calculated 28.8 03/12/2021 11:11 AM    Triglyceride 41 03/12/2021 11:11 AM     No components found for: GPT        Impression / Plan:        ICD-10-CM ICD-9-CM    1. Annual physical exam  Z00.00 V70.0 CBC WITH AUTOMATED DIFF      LIPID PANEL      TSH 3RD GENERATION      TSH 3RD GENERATION      LIPID PANEL      CBC WITH AUTOMATED DIFF   2. Leukopenia, unspecified type  N23.094 852.22 METABOLIC PANEL, COMPREHENSIVE      ALYSHA, DIRECT, W/REFLEX      ALYSHA, DIRECT, W/REFLEX      METABOLIC PANEL, COMPREHENSIVE   3. Elevated LFTs  R79.89 790.6    4. Anemia, unspecified type  D64.9 285.9 IRON PROFILE      METABOLIC PANEL, COMPREHENSIVE      VITAMIN B12 & FOLATE      VITAMIN B12 & FOLATE      METABOLIC PANEL, COMPREHENSIVE      IRON PROFILE   5. B12 deficiency  E53.8 266.2 VITAMIN B12 & FOLATE      VITAMIN B12 & FOLATE   6. Vitamin D deficiency  E55.9 268.9 VITAMIN D, 25 HYDROXY      VITAMIN D, 25 HYDROXY   7. Itching  L29.9 698.9 REFERRAL TO ALLERGY   8. Seasonal allergic rhinitis due to pollen  J30.1 477.0 fluticasone propionate (FLONASE) 50 mcg/actuation nasal spray         Explained to patient risk benefits of the medications. Advised patient to stop meds if having any side effects. Pt verbalized understanding of the instructions. I have discussed the diagnosis with the patient and the intended plan as seen in the above orders. The patient has received an after-visit summary and questions were answered concerning future plans. I have discussed medication side effects and warnings with the patient as well. I have reviewed the plan of care with the patient, accepted their input and they are in agreement with the treatment goals. Reviewed plan of care. Patient has provided input and agrees with goals. Follow-up and Dispositions    · Return in about 1 year (around 3/12/2022).          Dallin Thacker MD

## 2021-03-13 NOTE — PROGRESS NOTES
VITAMIN D LEVELS ARE LOW,SENDING VITAMIN D TO PHARMACY. WHITE COUNT HAS IMPROVED.  REST OF THE LABS ARE NORMAL

## 2022-09-01 ENCOUNTER — OFFICE VISIT (OUTPATIENT)
Dept: PRIMARY CARE CLINIC | Age: 32
End: 2022-09-01
Payer: COMMERCIAL

## 2022-09-01 VITALS
TEMPERATURE: 97 F | HEIGHT: 61 IN | BODY MASS INDEX: 24.09 KG/M2 | WEIGHT: 127.6 LBS | HEART RATE: 79 BPM | RESPIRATION RATE: 18 BRPM | OXYGEN SATURATION: 100 % | SYSTOLIC BLOOD PRESSURE: 94 MMHG | DIASTOLIC BLOOD PRESSURE: 55 MMHG

## 2022-09-01 DIAGNOSIS — Z00.00 ANNUAL PHYSICAL EXAM: Primary | ICD-10-CM

## 2022-09-01 DIAGNOSIS — J34.3 NASAL TURBINATE HYPERTROPHY: ICD-10-CM

## 2022-09-01 DIAGNOSIS — J30.1 SEASONAL ALLERGIC RHINITIS DUE TO POLLEN: ICD-10-CM

## 2022-09-01 DIAGNOSIS — R21 RASH DUE TO ALLERGY: ICD-10-CM

## 2022-09-01 DIAGNOSIS — E55.9 VITAMIN D DEFICIENCY: ICD-10-CM

## 2022-09-01 DIAGNOSIS — T78.40XA RASH DUE TO ALLERGY: ICD-10-CM

## 2022-09-01 PROCEDURE — 99395 PREV VISIT EST AGE 18-39: CPT | Performed by: INTERNAL MEDICINE

## 2022-09-01 RX ORDER — FLUTICASONE PROPIONATE 50 MCG
SPRAY, SUSPENSION (ML) NASAL
Qty: 1 EACH | Refills: 3 | Status: SHIPPED | OUTPATIENT
Start: 2022-09-01

## 2022-09-01 RX ORDER — LORATADINE 10 MG/1
10 TABLET ORAL DAILY
Qty: 90 TABLET | Refills: 1 | Status: SHIPPED | OUTPATIENT
Start: 2022-09-01

## 2022-09-01 NOTE — PROGRESS NOTES
1. \"Have you been to the ER, urgent care clinic since your last visit? Hospitalized since your last visit? \" Yes When: Urgent Care     2. \"Have you seen or consulted any other health care providers outside of the [address] [place] [address] since your last visit? \" Yes When: [facility]       3. For patients aged 39-70: Has the patient had a colonoscopy / FIT/ Cologuard? NA - based on age      If the patient is female:    4. For patients aged 41-77: Has the patient had a mammogram within the past 2 years? NA - based on age or sex      11. For patients aged 21-65: Has the patient had a pap smear?  Yes - no Care Gap present  2020    Chief Complaint   Patient presents with    Physical    Referral Request     For a allergy test.

## 2022-09-01 NOTE — PROGRESS NOTES
Tonio Sierra is a 33 y.o.  female and presents with     Chief Complaint   Patient presents with    Physical    Referral Request     For a allergy test.     Pt is here for a physical.  Every few days she starts itching and gets a rash  Always sneezing  Takes childrens zyrtec or benadryl. Has twins  Pt has 2 dogs, has a bearded dragon, 3 lizards, had birds. Does not feel light headed or dizzy. Past Medical History:   Diagnosis Date    Anemia NEC     Asthma     Asthma 6/6/2013     Past Surgical History:   Procedure Laterality Date    HX TUBAL LIGATION  09/09/2020     Current Outpatient Medications   Medication Sig    loratadine (Claritin) 10 mg tablet Take 1 Tablet by mouth daily. fluticasone propionate (FLONASE) 50 mcg/actuation nasal spray One spray each nostril daily    cholecalciferol (VITAMIN D3) (2,000 UNITS /50 MCG) cap capsule Take 1 Cap by mouth daily. (Patient not taking: Reported on 9/1/2022)    ferrous sulfate 325 mg (65 mg iron) tablet Take 1 Tab by mouth two (2) times a day. (Patient not taking: Reported on 9/1/2022)    budesonide-formoterol (SYMBICORT) 160-4.5 mcg/actuation HFAA Take 2 Puffs by inhalation two (2) times a day. (Patient not taking: Reported on 9/1/2022)     No current facility-administered medications for this visit. Health Maintenance   Topic Date Due    Hepatitis C Screening  Never done    Pneumococcal 0-64 years (1 - PCV) Never done    DTaP/Tdap/Td series (1 - Tdap) Never done    Cervical cancer screen  Never done    COVID-19 Vaccine (3 - Booster for Pfizer series) 11/16/2021    Depression Screen  03/12/2022    Flu Vaccine (1) Never done     Immunization History   Administered Date(s) Administered    COVID-19, PFIZER PURPLE top, DILUTE for use, (age 15 y+), IM, 30mcg/0.3mL 05/26/2021, 06/16/2021     Patient's last menstrual period was 07/12/2022.         Allergies and Intolerances:   No Known Allergies    Family History:   Family History   Problem Relation Age of Onset Cancer Father     Hypertension Father     OSTEOARTHRITIS Mother        Social History:   She  reports that she has never smoked. She has never used smokeless tobacco.  She  reports no history of alcohol use. Review of Systems:   General: negative for - chills, fatigue, fever, weight change  Psych: negative for - anxiety, depression, irritability or mood swings  ENT: negative for - headaches, hearing change, nasal congestion, oral lesions, sneezing or sore throat  Heme/ Lymph: negative for - bleeding problems, bruising, pallor or swollen lymph nodes  Endo: negative for - hot flashes, polydipsia/polyuria or temperature intolerance  Resp: negative for - cough, shortness of breath or wheezing  CV: negative for - chest pain, edema or palpitations  GI: negative for - abdominal pain, change in bowel habits, constipation, diarrhea or nausea/vomiting  : negative for - dysuria, hematuria, incontinence, pelvic pain or vulvar/vaginal symptoms  MSK: negative for - joint pain, joint swelling or muscle pain  Neuro: negative for - confusion, headaches, seizures or weakness  Derm: negative for - dry skin, hair changes, rash or skin lesion changes          Physical:   Vitals:   Vitals:    09/01/22 1428   BP: (!) 94/55   Pulse: 79   Resp: 18   Temp: 97 °F (36.1 °C)   TempSrc: Temporal   SpO2: 100%   Weight: 127 lb 9.6 oz (57.9 kg)   Height: 5' 1\" (1.549 m)           Exam:   HEENT- atraumatic,normocephalic, awake, oriented, well nourished, enlarged nasal turbinates   Neck - supple,no enlarged lymph nodes, no JVD, no thyromegaly  Chest- CTA, no rhonchi, no crackles  Heart- rrr, no murmurs / gallop/rub  Abdomen- soft,BS+,NT, no hepatosplenomegaly  Ext - no c/c/edema   Neuro- no focal deficits. Power 5/5 all extremities  Skin - warm,dry, no obvious rashes.           Review of Data:   LABS:   Lab Results   Component Value Date/Time    WBC 3.0 (L) 03/12/2021 11:11 AM    HGB 11.7 03/12/2021 11:11 AM    HCT 37.4 03/12/2021 11:11 AM    PLATELET 176 53/57/7459 11:11 AM     Lab Results   Component Value Date/Time    Sodium 138 03/12/2021 11:11 AM    Potassium 4.3 03/12/2021 11:11 AM    Chloride 106 03/12/2021 11:11 AM    CO2 26 03/12/2021 11:11 AM    Glucose 82 03/12/2021 11:11 AM    BUN 17 03/12/2021 11:11 AM    Creatinine 0.71 03/12/2021 11:11 AM     Lab Results   Component Value Date/Time    Cholesterol, total 143 03/12/2021 11:11 AM    HDL Cholesterol 106 03/12/2021 11:11 AM    LDL, calculated 28.8 03/12/2021 11:11 AM    Triglyceride 41 03/12/2021 11:11 AM     No components found for: GPT        Impression / Plan:        ICD-10-CM ICD-9-CM    1. Annual physical exam  Z00.00 V70.0 LIPID PANEL      CBC WITH AUTOMATED DIFF      VITAMIN D, 25 HYDROXY      TSH 3RD GENERATION      METABOLIC PANEL, COMPREHENSIVE      2. Seasonal allergic rhinitis due to pollen  J30.1 477.0 REFERRAL TO ALLERGY      REFERRAL TO ALLERGY      loratadine (Claritin) 10 mg tablet      fluticasone propionate (FLONASE) 50 mcg/actuation nasal spray      3. Rash due to allergy  T78.40XA 782.1 REFERRAL TO ALLERGY    R21        4. Vitamin D deficiency  E55.9 268.9 VITAMIN D, 25 HYDROXY      5. Nasal turbinate hypertrophy  J34.3 478.0 fluticasone propionate (FLONASE) 50 mcg/actuation nasal spray          Explained to patient risk benefits of the medications. Advised patient to stop meds if having any side effects. Pt verbalized understanding of the instructions. I have discussed the diagnosis with the patient and the intended plan as seen in the above orders. The patient has received an after-visit summary and questions were answered concerning future plans. I have discussed medication side effects and warnings with the patient as well. I have reviewed the plan of care with the patient, accepted their input and they are in agreement with the treatment goals. Reviewed plan of care. Patient has provided input and agrees with goals.     Follow-up and Dispositions    Return in about 1 year (around 9/1/2023).          Mami Liu MD

## 2023-01-11 DIAGNOSIS — E55.9 VITAMIN D DEFICIENCY: ICD-10-CM

## 2023-01-12 RX ORDER — ACETAMINOPHEN 500 MG
2000 TABLET ORAL DAILY
Qty: 90 CAPSULE | Refills: 1 | Status: SHIPPED | OUTPATIENT
Start: 2023-01-12

## 2023-05-23 RX ORDER — LORATADINE 10 MG/1
10 TABLET ORAL DAILY
COMMUNITY
Start: 2022-09-01

## 2023-05-23 RX ORDER — FERROUS SULFATE 325(65) MG
325 TABLET ORAL 2 TIMES DAILY
COMMUNITY
Start: 2019-05-02

## 2023-05-23 RX ORDER — BUDESONIDE AND FORMOTEROL FUMARATE DIHYDRATE 160; 4.5 UG/1; UG/1
2 AEROSOL RESPIRATORY (INHALATION) 2 TIMES DAILY
COMMUNITY
Start: 2019-04-25

## 2023-05-23 RX ORDER — FLUTICASONE PROPIONATE 50 MCG
SPRAY, SUSPENSION (ML) NASAL
COMMUNITY
Start: 2022-09-01

## 2024-03-25 ENCOUNTER — TELEPHONE (OUTPATIENT)
Dept: PRIMARY CARE CLINIC | Facility: CLINIC | Age: 34
End: 2024-03-25